# Patient Record
Sex: FEMALE | Race: WHITE
[De-identification: names, ages, dates, MRNs, and addresses within clinical notes are randomized per-mention and may not be internally consistent; named-entity substitution may affect disease eponyms.]

---

## 2018-01-21 ENCOUNTER — HOSPITAL ENCOUNTER (EMERGENCY)
Dept: HOSPITAL 95 - ER | Age: 83
Discharge: HOME | End: 2018-01-21
Payer: MEDICARE

## 2018-01-21 VITALS — HEIGHT: 58 IN | WEIGHT: 95 LBS | BODY MASS INDEX: 19.94 KG/M2

## 2018-01-21 DIAGNOSIS — Z90.710: ICD-10-CM

## 2018-01-21 DIAGNOSIS — Z79.82: ICD-10-CM

## 2018-01-21 DIAGNOSIS — F41.9: ICD-10-CM

## 2018-01-21 DIAGNOSIS — I25.10: ICD-10-CM

## 2018-01-21 DIAGNOSIS — Z79.899: ICD-10-CM

## 2018-01-21 DIAGNOSIS — Z88.2: ICD-10-CM

## 2018-01-21 DIAGNOSIS — R11.2: Primary | ICD-10-CM

## 2018-01-21 DIAGNOSIS — Z90.49: ICD-10-CM

## 2018-01-21 DIAGNOSIS — I10: ICD-10-CM

## 2018-01-21 DIAGNOSIS — Z88.8: ICD-10-CM

## 2018-01-21 DIAGNOSIS — I25.2: ICD-10-CM

## 2018-01-21 DIAGNOSIS — E03.9: ICD-10-CM

## 2018-01-21 LAB
ALBUMIN SERPL BCP-MCNC: 3.5 G/DL (ref 3.4–5)
ALBUMIN/GLOB SERPL: 1.2 {RATIO} (ref 0.8–1.8)
ALT SERPL W P-5'-P-CCNC: 55 U/L (ref 12–78)
ANION GAP SERPL CALCULATED.4IONS-SCNC: 7 MMOL/L (ref 6–16)
AST SERPL W P-5'-P-CCNC: 37 U/L (ref 12–37)
BASOPHILS # BLD AUTO: 0.03 K/MM3 (ref 0–0.23)
BASOPHILS NFR BLD AUTO: 0 % (ref 0–2)
BILIRUB SERPL-MCNC: 0.8 MG/DL (ref 0.1–1)
BUN SERPL-MCNC: 24 MG/DL (ref 8–24)
CALCIUM SERPL-MCNC: 8.2 MG/DL (ref 8.5–10.1)
CHLORIDE SERPL-SCNC: 104 MMOL/L (ref 98–108)
CO2 SERPL-SCNC: 26 MMOL/L (ref 21–32)
CREAT SERPL-MCNC: 0.49 MG/DL (ref 0.4–1)
DEPRECATED RDW RBC AUTO: 42.9 FL (ref 35.1–46.3)
EOSINOPHIL # BLD AUTO: 0.03 K/MM3 (ref 0–0.68)
EOSINOPHIL NFR BLD AUTO: 0 % (ref 0–6)
ERYTHROCYTE [DISTWIDTH] IN BLOOD BY AUTOMATED COUNT: 12.9 % (ref 11.7–14.2)
GLOBULIN SER CALC-MCNC: 2.9 G/DL (ref 2.2–4)
GLUCOSE SERPL-MCNC: 125 MG/DL (ref 70–99)
HCT VFR BLD AUTO: 45.5 % (ref 33–51)
HGB BLD-MCNC: 15.3 G/DL (ref 11.5–16)
IMM GRANULOCYTES # BLD AUTO: 0.06 K/MM3 (ref 0–0.1)
IMM GRANULOCYTES NFR BLD AUTO: 0 % (ref 0–1)
LYMPHOCYTES # BLD AUTO: 0.35 K/MM3 (ref 0.84–5.2)
LYMPHOCYTES NFR BLD AUTO: 3 % (ref 21–46)
MCHC RBC AUTO-ENTMCNC: 33.6 G/DL (ref 31.5–36.5)
MCV RBC AUTO: 91 FL (ref 80–100)
MONOCYTES # BLD AUTO: 0.53 K/MM3 (ref 0.16–1.47)
MONOCYTES NFR BLD AUTO: 4 % (ref 4–13)
NEUTROPHILS # BLD AUTO: 12.76 K/MM3 (ref 1.96–9.15)
NEUTROPHILS NFR BLD AUTO: 93 % (ref 41–73)
NRBC # BLD AUTO: 0 K/MM3 (ref 0–0.02)
NRBC BLD AUTO-RTO: 0 /100 WBC (ref 0–0.2)
PLATELET # BLD AUTO: 225 K/MM3 (ref 150–400)
POTASSIUM SERPL-SCNC: 4.4 MMOL/L (ref 3.5–5.5)
PROT SERPL-MCNC: 6.4 G/DL (ref 6.4–8.2)
SODIUM SERPL-SCNC: 137 MMOL/L (ref 136–145)
SP GR SPEC: 1.02 (ref 1–1.02)
UROBILINOGEN UR STRIP-MCNC: (no result) MG/DL

## 2018-09-13 ENCOUNTER — HOSPITAL ENCOUNTER (EMERGENCY)
Dept: HOSPITAL 95 - ER | Age: 83
Discharge: HOME | End: 2018-09-13
Payer: MEDICARE

## 2018-09-13 VITALS — WEIGHT: 95 LBS | BODY MASS INDEX: 19.94 KG/M2 | HEIGHT: 58 IN

## 2018-09-13 DIAGNOSIS — F41.9: ICD-10-CM

## 2018-09-13 DIAGNOSIS — I25.2: ICD-10-CM

## 2018-09-13 DIAGNOSIS — E03.9: ICD-10-CM
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
## 2018-09-13 DIAGNOSIS — S05.91XA: Primary | ICD-10-CM
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
## 2018-09-13 DIAGNOSIS — Z79.82: ICD-10-CM

## 2018-09-13 DIAGNOSIS — Z79.899: ICD-10-CM

## 2018-09-15 ENCOUNTER — HOSPITAL ENCOUNTER (EMERGENCY)
Dept: HOSPITAL 95 - ER | Age: 83
LOS: 1 days | Discharge: HOME | End: 2018-09-16
Payer: MEDICARE

## 2018-09-15 VITALS — WEIGHT: 130.01 LBS | BODY MASS INDEX: 23.04 KG/M2 | HEIGHT: 63 IN

## 2018-09-15 DIAGNOSIS — Z79.82: ICD-10-CM

## 2018-09-15 DIAGNOSIS — S05.31XD: Primary | ICD-10-CM

## 2018-09-15 DIAGNOSIS — I25.2: ICD-10-CM

## 2018-09-15 DIAGNOSIS — I10: ICD-10-CM

## 2018-09-15 DIAGNOSIS — F41.9: ICD-10-CM

## 2018-09-15 DIAGNOSIS — Z88.8: ICD-10-CM

## 2018-09-15 DIAGNOSIS — Z79.899: ICD-10-CM

## 2018-09-15 DIAGNOSIS — H05.011: ICD-10-CM
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
## 2018-09-15 DIAGNOSIS — Z88.2: ICD-10-CM

## 2018-09-15 DIAGNOSIS — H10.9: ICD-10-CM

## 2019-10-08 NOTE — NUR
ADMISSION NOTE
PT ARRIVED TO UNIT VIA STRETCHER. AMBULATED INDEPENDENTLY TO BED.
DENIES ANY DIZZINESS DURING AMBULATION. PROVIDED WITH JELL-O AND CRACKERS.
ADMISSION COMPLETE. ASSUMING CARE OF PT.
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
## 2020-03-02 NOTE — NUR
SHIFT SUMMARY:
NO ACUTE CHANGES TO REPORT THIS SHIFT. PT A&O; [place]; CALM AND COOPERATIVE WITH
CARE. MEDICATED FOR THROAT PAIN PER EMAR. TELE IN PLACE; SR @ 85 PER TELE TECH
DURING MORNING ASSESSMENT.O2 @ 2L; PT ON ROOM AIR AT HOME; HX COPD. PT UP TO
BATHROOM c 1-ASSIST. PO STEROIDS CONTINUING. REPORT GIVEN TO ONCOMING RN.

## 2020-03-03 NOTE — NUR
SUMMARY
PT HAD NO NEW ISSUES NOTED. PT IS CAREFUL WITH SWALLOWING HER MEDS. PT HAS NOT
HAD A BM THIS SHIFT. PT HAD NO EPISODES OF SOB. PT HAS BEEN VOIDING URINE T/O
SHIFT. PT HAS NOT SLEPT MUCH THIS SHIFT. PT CURRENTLY RESTING AND BREATHING
EASY. CALL LIGHT IN REACH. TM
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
## 2020-03-04 NOTE — NUR
1230 PT TO DC HOME ON 02. WENT OVER NEW MEDS WITH SON AND MEDS FAXED TO
PHARMACY. HOME O2 EVAL DONE. PT DOC TO FOLLOW UP WITH APPOINTMENT TIME. IV
REMOVED, NO SS OF INFECTION NOTED. PT TO BE TAKEN OUT VIA WC.

## 2020-03-04 NOTE — NUR
PT IS PLEASANT AND COOPERATIVE WITH CARE. AAOX4 RESP E/U ON 2LNC.  IN
ROOM WITH PT. STEADY GATE. NO COMPLAINTS AT THIS TIME. LAST TELE CHECK NSR
RATE 86.

## 2020-03-04 NOTE — NUR
SHIFT SUMMARY
SEE PREVIOUS NOTE. PT PLEASANT AND COOPERATIVE WITH CARE. NO ACUTE CHANGES OR
COMPLAINTS THIS NIGHT. VSS. LAST TELE CHECK AROUND 0600 NSR RATE 81. RESP ARE
EVEN AND UNLABORED ON 2L O2. REPORT ON ONCOMING RN.